# Patient Record
Sex: MALE | Race: BLACK OR AFRICAN AMERICAN | Employment: OTHER | URBAN - METROPOLITAN AREA
[De-identification: names, ages, dates, MRNs, and addresses within clinical notes are randomized per-mention and may not be internally consistent; named-entity substitution may affect disease eponyms.]

---

## 2019-05-15 ENCOUNTER — APPOINTMENT (OUTPATIENT)
Dept: GENERAL RADIOLOGY | Age: 69
End: 2019-05-15
Attending: EMERGENCY MEDICINE
Payer: MEDICARE

## 2019-05-15 ENCOUNTER — APPOINTMENT (OUTPATIENT)
Dept: ULTRASOUND IMAGING | Age: 69
End: 2019-05-15
Attending: EMERGENCY MEDICINE
Payer: MEDICARE

## 2019-05-15 ENCOUNTER — HOSPITAL ENCOUNTER (EMERGENCY)
Age: 69
Discharge: OTHER INSTITUTION | End: 2019-05-15
Attending: EMERGENCY MEDICINE | Admitting: EMERGENCY MEDICINE
Payer: MEDICARE

## 2019-05-15 VITALS
HEART RATE: 82 BPM | SYSTOLIC BLOOD PRESSURE: 128 MMHG | WEIGHT: 156 LBS | TEMPERATURE: 98.2 F | OXYGEN SATURATION: 100 % | DIASTOLIC BLOOD PRESSURE: 64 MMHG | RESPIRATION RATE: 17 BRPM

## 2019-05-15 DIAGNOSIS — E86.0 DEHYDRATION: ICD-10-CM

## 2019-05-15 DIAGNOSIS — N17.9 ACUTE RENAL FAILURE, UNSPECIFIED ACUTE RENAL FAILURE TYPE (HCC): Primary | ICD-10-CM

## 2019-05-15 LAB
ALBUMIN SERPL-MCNC: 2.6 G/DL (ref 3.4–5)
ALBUMIN/GLOB SERPL: 0.4 {RATIO} (ref 0.8–1.7)
ALP SERPL-CCNC: 141 U/L (ref 45–117)
ALT SERPL-CCNC: 103 U/L (ref 16–61)
ANION GAP SERPL CALC-SCNC: 18 MMOL/L (ref 3–18)
ANION GAP SERPL CALC-SCNC: 19 MMOL/L (ref 3–18)
APPEARANCE UR: ABNORMAL
AST SERPL-CCNC: 173 U/L (ref 15–37)
ATRIAL RATE: 75 BPM
BACTERIA URNS QL MICRO: ABNORMAL /HPF
BASOPHILS # BLD: 0 K/UL (ref 0–0.06)
BASOPHILS NFR BLD: 0 % (ref 0–3)
BILIRUB SERPL-MCNC: 0.7 MG/DL (ref 0.2–1)
BILIRUB UR QL: NEGATIVE
BNP SERPL-MCNC: ABNORMAL PG/ML (ref 0–900)
BUN SERPL-MCNC: 131 MG/DL (ref 7–18)
BUN SERPL-MCNC: 144 MG/DL (ref 7–18)
BUN/CREAT SERPL: 12 (ref 12–20)
BUN/CREAT SERPL: 13 (ref 12–20)
CALCIUM SERPL-MCNC: 7.2 MG/DL (ref 8.5–10.1)
CALCIUM SERPL-MCNC: 8.8 MG/DL (ref 8.5–10.1)
CALCULATED P AXIS, ECG09: 55 DEGREES
CALCULATED R AXIS, ECG10: 72 DEGREES
CALCULATED T AXIS, ECG11: 60 DEGREES
CHLORIDE SERPL-SCNC: 107 MMOL/L (ref 100–108)
CHLORIDE SERPL-SCNC: 96 MMOL/L (ref 100–108)
CK MB CFR SERPL CALC: 1.9 % (ref 0–4)
CK MB SERPL-MCNC: 4.6 NG/ML (ref 5–25)
CK SERPL-CCNC: 236 U/L (ref 39–308)
CO2 SERPL-SCNC: 12 MMOL/L (ref 21–32)
CO2 SERPL-SCNC: 14 MMOL/L (ref 21–32)
COLOR UR: YELLOW
CREAT SERPL-MCNC: 11.7 MG/DL (ref 0.6–1.3)
CREAT SERPL-MCNC: 9.86 MG/DL (ref 0.6–1.3)
DIAGNOSIS, 93000: NORMAL
DIFFERENTIAL METHOD BLD: ABNORMAL
EOSINOPHIL # BLD: 0.9 K/UL (ref 0–0.4)
EOSINOPHIL NFR BLD: 6 % (ref 0–5)
EPITH CASTS URNS QL MICRO: ABNORMAL /LPF (ref 0–5)
ERYTHROCYTE [DISTWIDTH] IN BLOOD BY AUTOMATED COUNT: 13.1 % (ref 11.6–14.5)
GLOBULIN SER CALC-MCNC: 6.7 G/DL (ref 2–4)
GLUCOSE BLD STRIP.AUTO-MCNC: 68 MG/DL (ref 70–110)
GLUCOSE SERPL-MCNC: 78 MG/DL (ref 74–99)
GLUCOSE SERPL-MCNC: 94 MG/DL (ref 74–99)
GLUCOSE UR STRIP.AUTO-MCNC: NEGATIVE MG/DL
HCT VFR BLD AUTO: 31.8 % (ref 36–48)
HGB BLD-MCNC: 11.4 G/DL (ref 13–16)
HGB UR QL STRIP: ABNORMAL
KETONES UR QL STRIP.AUTO: NEGATIVE MG/DL
LEUKOCYTE ESTERASE UR QL STRIP.AUTO: ABNORMAL
LYMPHOCYTES # BLD: 1.3 K/UL (ref 0.8–3.5)
LYMPHOCYTES NFR BLD: 9 % (ref 20–51)
MCH RBC QN AUTO: 30.8 PG (ref 24–34)
MCHC RBC AUTO-ENTMCNC: 35.8 G/DL (ref 31–37)
MCV RBC AUTO: 85.9 FL (ref 74–97)
MONOCYTES # BLD: 0.6 K/UL (ref 0–1)
MONOCYTES NFR BLD: 4 % (ref 2–9)
NEUTS BAND NFR BLD MANUAL: 5 % (ref 0–5)
NEUTS SEG # BLD: 11.9 K/UL (ref 1.8–8)
NEUTS SEG NFR BLD: 76 % (ref 42–75)
NITRITE UR QL STRIP.AUTO: NEGATIVE
P-R INTERVAL, ECG05: 154 MS
PH UR STRIP: 7 [PH] (ref 5–8)
PLATELET # BLD AUTO: 114 K/UL (ref 135–420)
PLATELET COMMENTS,PCOM: ABNORMAL
PMV BLD AUTO: 12.8 FL (ref 9.2–11.8)
POTASSIUM SERPL-SCNC: 4.4 MMOL/L (ref 3.5–5.5)
POTASSIUM SERPL-SCNC: 5 MMOL/L (ref 3.5–5.5)
PROT SERPL-MCNC: 9.3 G/DL (ref 6.4–8.2)
PROT UR STRIP-MCNC: 300 MG/DL
Q-T INTERVAL, ECG07: 362 MS
QRS DURATION, ECG06: 92 MS
QTC CALCULATION (BEZET), ECG08: 404 MS
RBC # BLD AUTO: 3.7 M/UL (ref 4.7–5.5)
RBC #/AREA URNS HPF: ABNORMAL /HPF (ref 0–5)
RBC MORPH BLD: ABNORMAL
SODIUM SERPL-SCNC: 129 MMOL/L (ref 136–145)
SODIUM SERPL-SCNC: 137 MMOL/L (ref 136–145)
SP GR UR REFRACTOMETRY: 1.01 (ref 1–1.03)
TROPONIN I SERPL-MCNC: 0.06 NG/ML (ref 0–0.04)
UROBILINOGEN UR QL STRIP.AUTO: 1 EU/DL (ref 0.2–1)
VENTRICULAR RATE, ECG03: 75 BPM
WBC # BLD AUTO: 14.7 K/UL (ref 4.6–13.2)
WBC MORPH BLD: ABNORMAL
WBC URNS QL MICRO: ABNORMAL /HPF (ref 0–4)

## 2019-05-15 PROCEDURE — 82962 GLUCOSE BLOOD TEST: CPT

## 2019-05-15 PROCEDURE — 87086 URINE CULTURE/COLONY COUNT: CPT

## 2019-05-15 PROCEDURE — 96360 HYDRATION IV INFUSION INIT: CPT

## 2019-05-15 PROCEDURE — 81001 URINALYSIS AUTO W/SCOPE: CPT

## 2019-05-15 PROCEDURE — 77030034850

## 2019-05-15 PROCEDURE — 76770 US EXAM ABDO BACK WALL COMP: CPT

## 2019-05-15 PROCEDURE — 74011250636 HC RX REV CODE- 250/636: Performed by: EMERGENCY MEDICINE

## 2019-05-15 PROCEDURE — 80053 COMPREHEN METABOLIC PANEL: CPT

## 2019-05-15 PROCEDURE — 82550 ASSAY OF CK (CPK): CPT

## 2019-05-15 PROCEDURE — 85025 COMPLETE CBC W/AUTO DIFF WBC: CPT

## 2019-05-15 PROCEDURE — 87186 SC STD MICRODIL/AGAR DIL: CPT

## 2019-05-15 PROCEDURE — 83880 ASSAY OF NATRIURETIC PEPTIDE: CPT

## 2019-05-15 PROCEDURE — 71046 X-RAY EXAM CHEST 2 VIEWS: CPT

## 2019-05-15 PROCEDURE — 87077 CULTURE AEROBIC IDENTIFY: CPT

## 2019-05-15 PROCEDURE — 96375 TX/PRO/DX INJ NEW DRUG ADDON: CPT

## 2019-05-15 PROCEDURE — 74011000250 HC RX REV CODE- 250: Performed by: EMERGENCY MEDICINE

## 2019-05-15 PROCEDURE — 96374 THER/PROPH/DIAG INJ IV PUSH: CPT

## 2019-05-15 PROCEDURE — 99285 EMERGENCY DEPT VISIT HI MDM: CPT

## 2019-05-15 PROCEDURE — 93005 ELECTROCARDIOGRAM TRACING: CPT

## 2019-05-15 PROCEDURE — 96361 HYDRATE IV INFUSION ADD-ON: CPT

## 2019-05-15 RX ORDER — METOPROLOL TARTRATE 50 MG/1
TABLET ORAL 2 TIMES DAILY
COMMUNITY

## 2019-05-15 RX ORDER — METOCLOPRAMIDE HYDROCHLORIDE 5 MG/ML
5 INJECTION INTRAMUSCULAR; INTRAVENOUS EVERY 6 HOURS
Status: DISCONTINUED | OUTPATIENT
Start: 2019-05-15 | End: 2019-05-15

## 2019-05-15 RX ORDER — SODIUM CHLORIDE 9 MG/ML
125 INJECTION, SOLUTION INTRAVENOUS CONTINUOUS
Status: DISCONTINUED | OUTPATIENT
Start: 2019-05-15 | End: 2019-05-16 | Stop reason: HOSPADM

## 2019-05-15 RX ORDER — HYDROCHLOROTHIAZIDE 25 MG/1
25 TABLET ORAL DAILY
COMMUNITY
End: 2019-12-06

## 2019-05-15 RX ORDER — CEFTRIAXONE 250 MG/8ML
1000 INJECTION, POWDER, FOR SOLUTION INTRAMUSCULAR; INTRAVENOUS ONCE
Status: DISCONTINUED | OUTPATIENT
Start: 2019-05-15 | End: 2019-05-15

## 2019-05-15 RX ORDER — METOCLOPRAMIDE HYDROCHLORIDE 5 MG/ML
5 INJECTION INTRAMUSCULAR; INTRAVENOUS
Status: COMPLETED | OUTPATIENT
Start: 2019-05-15 | End: 2019-05-15

## 2019-05-15 RX ORDER — LISINOPRIL 5 MG/1
TABLET ORAL DAILY
COMMUNITY

## 2019-05-15 RX ORDER — CLOBETASOL PROPIONATE 0.5 MG/ML
LOTION TOPICAL 2 TIMES DAILY
COMMUNITY
End: 2019-12-06

## 2019-05-15 RX ORDER — IBUPROFEN 200 MG
TABLET ORAL
COMMUNITY

## 2019-05-15 RX ADMIN — SODIUM CHLORIDE 500 ML: 900 INJECTION, SOLUTION INTRAVENOUS at 12:51

## 2019-05-15 RX ADMIN — SODIUM CHLORIDE 125 ML/HR: 900 INJECTION, SOLUTION INTRAVENOUS at 15:35

## 2019-05-15 RX ADMIN — METOCLOPRAMIDE 5 MG: 5 INJECTION, SOLUTION INTRAMUSCULAR; INTRAVENOUS at 16:23

## 2019-05-15 RX ADMIN — WATER 1 G: 1 INJECTION INTRAMUSCULAR; INTRAVENOUS; SUBCUTANEOUS at 15:51

## 2019-05-15 NOTE — ED NOTES
Reported from family pt had episode of vomiting clear emesis. Pt states still nauseated. Dr. Heber Seay notified. Verbal order obtained for 5 mg Reglan IV.

## 2019-05-15 NOTE — ED NOTES
TXFER STATUS UPDATE. . [facility] DON[facility] CURRENTLY HAS NO BEDS AVAILABLE. .. WILL CHK AGAIN LATER CHRISTOPHER. Floresita Ghosh ..

## 2019-05-15 NOTE — ED NOTES
PT IS RESTING QUIETLY WITH EYES CLOSED. .. RESP. EFFORT= EASY. .. SON AT BEDSIDE. ..  SON SAYS PT IS ASKING FOR \"SOMETHING TO PUT HIM OUT. .. AND FOR A MEAL. ...

## 2019-05-15 NOTE — ED NOTES
Bedside and Verbal shift change report given to ADALI Larios (oncoming nurse) by Randi Odonnell RN (offgoing nurse). Report included the following information SBAR, ED Summary, Procedure Summary, MAR, Recent Results and Cardiac Rhythm nsr.

## 2019-05-15 NOTE — ED PROVIDER NOTES
EMERGENCY DEPARTMENT HISTORY AND PHYSICAL EXAM 
 
11:40 AM 
 
 
Date: 5/15/2019 Patient Name: Iqra Jewell History of Presenting Illness Chief Complaint Patient presents with  Dizziness History Provided By: Patient and Patient's Sister Additional History (Context): Iqra Jewell is a 80 y.o. male with PMHx of HTN, heart failure and MI who presents to the ED with c/o worsening dizziness for the past week. Describes dizziness as \"room-spinning. \" Associated symptoms include weakness, congestion, decreased appetite and numbness in L foot. Also describes \"stumbling\" with ambulation, lightheaded with standing up. Denies nausea, fever, cough or difficulty urinating. No modifying or aggravating factors were reported. No other symptoms or concerns were expressed. PCP: UNKNOWN 
 
Chief Complaint: Dizziness Duration:  1 week Timing:  Worsening Location: Generalized Quality: Not described Severity: not reported Modifying Factors: No modifying or aggravating factors were reported. Associated Symptoms: weakness, congestion, decreased appetite and numbness in L foot Current Facility-Administered Medications Medication Dose Route Frequency Provider Last Rate Last Dose  sodium chloride 0.9 % bolus infusion 1,000 mL  1,000 mL IntraVENous ONCE Malu Pretty DO      
 0.9% sodium chloride infusion  125 mL/hr IntraVENous CONTINUOUS Malu Pretty DO      
 cefTRIAXone (ROCEPHIN) injection 1,000 mg  1,000 mg IntraVENous ONCE Malu Pretty, DO      
 
Current Outpatient Medications Medication Sig Dispense Refill  lisinopril (PRINIVIL, ZESTRIL) 5 mg tablet Take  by mouth daily.  metoprolol tartrate (LOPRESSOR) 50 mg tablet Take  by mouth two (2) times a day.  hydroCHLOROthiazide (HYDRODIURIL) 25 mg tablet Take 25 mg by mouth daily.  ibuprofen (MOTRIN IB) 200 mg tablet Take  by mouth.  clobetasol (CLOBEX) 0.05 % lotion Apply  to affected area two (2) times a day. Past History Past Medical History: 
Past Medical History:  
Diagnosis Date  Heart failure ([facility] [address]  Hypertension  MI (myocardial infarction) ([facility] [address]  Psoriasis Past Surgical History: 
Past Surgical History:  
Procedure Laterality Date  CARDIAC SURG PROCEDURE UNLIST Triple bypass 2010 Family History: 
History reviewed. No pertinent family history. Social History: 
Social History Tobacco Use  Smoking status: Not on file Substance Use Topics  Alcohol use: Not on file  Drug use: Not on file Allergies: 
No Known Allergies Review of Systems Review of Systems Constitutional: Positive for appetite change (decreased). Negative for activity change, fatigue and fever. HENT: Positive for congestion. Negative for rhinorrhea. Eyes: Negative for visual disturbance. Respiratory: Negative for shortness of breath. Cardiovascular: Negative for chest pain and palpitations. Gastrointestinal: Negative for abdominal pain, diarrhea, nausea and vomiting. Genitourinary: Negative for dysuria and hematuria. Musculoskeletal: Negative for back pain. Skin: Negative for rash. Neurological: Positive for dizziness, weakness, light-headedness and numbness (L foot). All other systems reviewed and are negative. Physical Exam  
 
Visit Vitals /78 Pulse 74 Temp 97.4 °F (36.3 °C) Resp 19 Wt 70.8 kg (156 lb) SpO2 100% Physical Exam  
Constitutional: He is oriented to person, place, and time. He appears well-developed. No distress. HENT:  
Head: Normocephalic and atraumatic. Right Ear: External ear normal.  
Left Ear: External ear normal.  
Nose: Nose normal.  
Dry MM Eyes: Pupils are equal, round, and reactive to light. Conjunctivae and EOM are normal.  
Neck: Normal range of motion. Neck supple. No tracheal deviation present. Cardiovascular: Normal rate, regular rhythm and intact distal pulses. Pulmonary/Chest: Effort normal and breath sounds normal. He exhibits no tenderness. Abdominal: Soft. Bowel sounds are normal. He exhibits no distension. There is no tenderness. There is no rebound and no guarding. Musculoskeletal: Normal range of motion. He exhibits no edema or tenderness. Neurological: He is alert and oriented to person, place, and time. No cranial nerve deficit. Coordination normal.  
Skin: Skin is warm and dry. Psychiatric: He has a normal mood and affect. His behavior is normal. Thought content normal.  
Nursing note and vitals reviewed. Diagnostic Study Results Labs - Recent Results (from the past 12 hour(s)) EKG, 12 LEAD, INITIAL Collection Time: 05/15/19 11:21 AM  
Result Value Ref Range Ventricular Rate 75 BPM  
 Atrial Rate 75 BPM  
 P-R Interval 154 ms QRS Duration 92 ms Q-T Interval 362 ms QTC Calculation (Bezet) 404 ms Calculated P Axis 55 degrees Calculated R Axis 72 degrees Calculated T Axis 60 degrees Diagnosis Normal sinus rhythm Possible Left atrial enlargement Left ventricular hypertrophy Nonspecific ST and T wave abnormality Abnormal ECG No previous ECGs available Confirmed by Rosa Maria Gutierrez (Jerry) on 5/15/2019 1:37:03 PM 
  
CBC WITH AUTOMATED DIFF Collection Time: 05/15/19 11:35 AM  
Result Value Ref Range WBC 14.7 (H) 4.6 - 13.2 K/uL  
 RBC 3.70 (L) 4.70 - 5.50 M/uL  
 HGB 11.4 (L) 13.0 - 16.0 g/dL HCT 31.8 (L) 36.0 - 48.0 % MCV 85.9 74.0 - 97.0 FL  
 MCH 30.8 24.0 - 34.0 PG  
 MCHC 35.8 31.0 - 37.0 g/dL  
 RDW 13.1 11.6 - 14.5 % PLATELET 656 (L) 784 - 420 K/uL MPV 12.8 (H) 9.2 - 11.8 FL  
 NEUTROPHILS 76 (H) 42 - 75 % BAND NEUTROPHILS 5 0 - 5 % LYMPHOCYTES 9 (L) 20 - 51 % MONOCYTES 4 2 - 9 % EOSINOPHILS 6 (H) 0 - 5 % BASOPHILS 0 0 - 3 %  
 ABS. NEUTROPHILS 11.9 (H) 1.8 - 8.0 K/UL ABS. LYMPHOCYTES 1.3 0.8 - 3.5 K/UL  
 ABS. MONOCYTES 0.6 0 - 1.0 K/UL  
 ABS. EOSINOPHILS 0.9 (H) 0.0 - 0.4 K/UL  
 ABS. BASOPHILS 0.0 0.0 - 0.06 K/UL  
 DF MANUAL PLATELET COMMENTS DECREASED PLATELETS    
 RBC COMMENTS NORMOCYTIC, NORMOCHROMIC    
 WBC COMMENTS TOXIC GRANULATION    
METABOLIC PANEL, COMPREHENSIVE Collection Time: 05/15/19 11:35 AM  
Result Value Ref Range Sodium 129 (L) 136 - 145 mmol/L Potassium 5.0 3.5 - 5.5 mmol/L Chloride 96 (L) 100 - 108 mmol/L  
 CO2 14 (L) 21 - 32 mmol/L Anion gap 19 (H) 3.0 - 18 mmol/L Glucose 94 74 - 99 mg/dL  (H) 7.0 - 18 MG/DL Creatinine 11.70 (H) 0.6 - 1.3 MG/DL  
 BUN/Creatinine ratio 12 12 - 20 GFR est AA 5 (L) >60 ml/min/1.73m2 GFR est non-AA 4 (L) >60 ml/min/1.73m2 Calcium 8.8 8.5 - 10.1 MG/DL Bilirubin, total 0.7 0.2 - 1.0 MG/DL  
 ALT (SGPT) 103 (H) 16 - 61 U/L  
 AST (SGOT) 173 (H) 15 - 37 U/L Alk. phosphatase 141 (H) 45 - 117 U/L Protein, total 9.3 (H) 6.4 - 8.2 g/dL Albumin 2.6 (L) 3.4 - 5.0 g/dL Globulin 6.7 (H) 2.0 - 4.0 g/dL A-G Ratio 0.4 (L) 0.8 - 1.7 CARDIAC PANEL,(CK, CKMB & TROPONIN) Collection Time: 05/15/19 11:35 AM  
Result Value Ref Range  39 - 308 U/L  
 CK - MB 4.6 (H) <3.6 ng/ml CK-MB Index 1.9 0.0 - 4.0 % Troponin-I, QT 0.06 (H) 0.0 - 0.045 NG/ML  
NT-PRO BNP Collection Time: 05/15/19 11:35 AM  
Result Value Ref Range NT pro-BNP 15,626 (H) 0 - 900 PG/ML  
GLUCOSE, POC Collection Time: 05/15/19 11:40 AM  
Result Value Ref Range Glucose (POC) 68 (L) 70 - 110 mg/dL URINALYSIS W/ RFLX MICROSCOPIC Collection Time: 05/15/19 12:49 PM  
Result Value Ref Range Color YELLOW Appearance TURBID Specific gravity 1.012 1.005 - 1.030    
 pH (UA) 7.0 5.0 - 8.0 Protein 300 (A) NEG mg/dL Glucose NEGATIVE  NEG mg/dL Ketone NEGATIVE  NEG mg/dL Bilirubin NEGATIVE  NEG Blood MODERATE (A) NEG Urobilinogen 1.0 0.2 - 1.0 EU/dL Nitrites NEGATIVE  NEG Leukocyte Esterase LARGE (A) NEG URINE MICROSCOPIC ONLY Collection Time: 05/15/19 12:49 PM  
Result Value Ref Range WBC TOO NUMEROUS TO COUNT 0 - 4 /hpf  
 RBC 4 to 10 0 - 5 /hpf Epithelial cells FEW 0 - 5 /lpf Bacteria 4+ (A) NEG /hpf METABOLIC PANEL, BASIC Collection Time: 05/15/19  2:42 PM  
Result Value Ref Range Sodium 137 136 - 145 mmol/L Potassium 4.4 3.5 - 5.5 mmol/L Chloride 107 100 - 108 mmol/L  
 CO2 12 (L) 21 - 32 mmol/L Anion gap 18 3.0 - 18 mmol/L Glucose 78 74 - 99 mg/dL  (H) 7.0 - 18 MG/DL Creatinine 9.86 (H) 0.6 - 1.3 MG/DL  
 BUN/Creatinine ratio 13 12 - 20 GFR est AA 6 (L) >60 ml/min/1.73m2 GFR est non-AA 5 (L) >60 ml/min/1.73m2 Calcium 7.2 (L) 8.5 - 10.1 MG/DL Radiologic Studies -  
US RETROPERITONEUM COMP Final Result IMPRESSION:  
  
Sonographic appearance of the kidneys are within normal limits. No hydronephrosis. XR CHEST PA LAT Final Result IMPRESSION:  
  
1. No radiographic evidence for an acute cardiopulmonary abnormality. Medical Decision Making It should be noted that IElvia DO will be the provider of record for this patient. I reviewed the vital signs, available nursing notes, past medical history, past surgical history, family history and social history. Vital Signs-Reviewed the patient's vital signs. Pulse Oximetry Analysis -  100% on room air, stable. Cardiac Monitor: 
Rate: 74 Rhythm:  Normal Sinus Rhythm EKG: Interpreted by the EP. Time Interpreted: 11:21 AM 
 Rate: 75 Rhythm: Normal Sinus Rhythm Interpretation: No acute changes. Records Reviewed: Nursing Notes and Old Medical Records (Time of Review: 11:40 AM) ED Course: Progress Notes, Reevaluation, and Consults: 
12:30 PM: Reviewed and discussed labs with patient.  Patient offered to be transferred to [place], requests to be transferred to [facility] where he has had heart surgery before. Will contact transfer center. Consult:  Discussed care with transfer center. Standard discussion; including history of patients chief complaint, available diagnostic results, and treatment course. Will call back. 12:35 PM, 5/15/2019 Consult:  Discussed care with Dr. Zee De La Fuente, ICU. Standard discussion; including history of patients chief complaint, available diagnostic results, and treatment course. Accepts admission. Recommends repeating fluids. 1:03 PM, 5/15/2019  
 
3:31 PM 
Repeat BMP with improving sodium, potassium, and creatinine. Urine concerning for UTI. Rocephin initiated. Urine culture sent. Due to the patient's psoriasis, it is difficult to get blood work on him. Unable to obtain blood cultures. 4:58 PM 
Resting comfortably. Medically stable for transfer 8:30 PM 
Resting comfortably. Medically stable for transfer Provider Notes (Medical Decision Making):  
Patient is a 81-year-old male who comes in complaining of dizziness. Denies pain anywhere. States that he has not felt well over the last week and has not been eating or drinking over the last 5 days. Patient with creatinine of 11 today. Last creatinine from April was 2.2. Patient has no history of renal failure that he is aware of. Patient is also hyponatremic with a potassium of 5. Catheter has been placed and patient is making urine. IV fluid hydration has been started. Patient was offered to be [facility] but did not want to go there. Prefers to go to [facility], where he states he has been before for his heart. Of note, all the patient's records do appear to be in the [facility] system. Discussed with hospitalist who will accept the patient for transfer. Recommends renal ultrasound and repeat BMP at 1430.   Discussed with patient and family who are agreeable to transfer. Patient is stable at the time of transfer with no complaints. For Hospitalized Patients: 
 
1. Hospitalization Decision Time: The decision to hospitalize the patient was made by Dr. Sina Clancy at 1:09 PM on 5/15/2019 2. Aspirin: Aspirin was not given because the patient did not present with a stroke at the time of their Emergency Department evaluation Diagnosis Clinical Impression: 1. Acute renal failure, unspecified acute renal failure type ([address] 2. Dehydration Disposition: Transfer to [facility] for admission. Follow-up Information None Patient's Medications Start Taking No medications on file Continue Taking CLOBETASOL (CLOBEX) 0.05 % LOTION    Apply  to affected area two (2) times a day. HYDROCHLOROTHIAZIDE (HYDRODIURIL) 25 MG TABLET    Take 25 mg by mouth daily. IBUPROFEN (MOTRIN IB) 200 MG TABLET    Take  by mouth. LISINOPRIL (PRINIVIL, ZESTRIL) 5 MG TABLET    Take  by mouth daily. METOPROLOL TARTRATE (LOPRESSOR) 50 MG TABLET    Take  by mouth two (2) times a day. These Medications have changed No medications on file Stop Taking No medications on file  
 
_______________________________ Scribe Attestation Priscila Middleton acting as a scribe for and in the presence of Rufino Stone DO May 15, 2019 at 11:40 AM 
    
Provider Attestation:     
I personally performed the services described in the documentation, reviewed the documentation, as recorded by the scribe in my presence, and it accurately and completely records my words and actions. May 15, 2019 at 11:40 AM - Rufino SOUZA DO   
 
 
_______________________________

## 2019-05-16 NOTE — ED NOTES
LDA removed in [facility] for documentation purposes only. Patient admitted to hospital with; site 1- Peripheral IV, which at time of admission is Clean, Dry, and intact, no signs or symptoms of phlebitis. No signs or symptoms of infiltration. LDA removed in [facility] for documentation purposes only. Patient admitted to hospital with; site 1- Urinary Catheter, which at time of admission is Clean, Dry, and intact, no signs or symptoms of phlebitis. No signs or symptoms of infiltration, patent,draining.

## 2019-05-16 NOTE — ED NOTES
PT'S SON, RENY CARR -[phone], WAS CALLED TO INFORM HIM OF HIS TXFER AT THIS TIME TO [facility] ROOM # [id number]. Starr Castellano HE UNDERSTOOD. Starr Castellano

## 2019-05-16 NOTE — ED NOTES
REC'D A CALL FROM FRANCA EUCEDA RN AT [facility] STEP DOWN UNIT FOR A PT \"GENERAL OVERVIEW\" AND [facility] CHIEF COMPLAINT/HX/DX/CURRENT CONDITION INFO. GIVEN TO HER. . AS WELL AS FAMILY'S REQUEST TO TXFER THERE BECAUSE PT'S CARDIOLOGIST IS THERE. .. STILL NO PT ROOMS AVAILABLE. ..

## 2019-05-16 NOTE — ROUTINE PROCESS
TRANSFER - OUT REPORT: 
 
Verbal report given to Laura Rodri on [place]  being transferred to [place] [id number] # [id number] for routine progression of care Report consisted of patients Situation, Background, Assessment and  
Recommendations(SBAR). Information from the following report(s) SBAR, ED Summary, Intake/Output, MAR, Recent Results and Med Rec Status was reviewed with the receiving nurse. Lines:  
Peripheral IV 05/15/19 Left Antecubital (Active) Site Assessment Clean, dry, & intact 5/15/2019  3:38 PM  
Phlebitis Assessment 0 5/15/2019  3:38 PM  
Infiltration Assessment 0 5/15/2019  3:38 PM  
Dressing Status Clean, dry, & intact 5/15/2019  3:38 PM  
  
 
Opportunity for questions and clarification was provided. Patient transported with: 
 Monitor

## 2019-05-16 NOTE — ED NOTES
PT REMAINS RESTING WITH EYES CLOSED. .. ROUSES EASILY TO VERBAL. Gisselle Hoskins DENIES PAIN. Gisselle Gato HE DOES NOT WANT TO BE MOVED TO A HOSPITAL BED SAYING HE'S COMFORTABLE WHERE HE'S AT. .. HE EXPECTS TO STAY HERE FOR THE NIGHT. .. WILL NOTIFY HIS FAMILY OF TXFER TIME AND ROOM #. ...

## 2019-05-17 LAB
BACTERIA SPEC CULT: ABNORMAL
SERVICE CMNT-IMP: ABNORMAL

## 2020-07-12 ENCOUNTER — ANESTHESIA EVENT (OUTPATIENT)
Dept: ENDOSCOPY | Age: 70
End: 2020-07-12
Payer: MEDICARE

## 2020-07-13 ENCOUNTER — HOSPITAL ENCOUNTER (OUTPATIENT)
Age: 70
Setting detail: OUTPATIENT SURGERY
Discharge: HOME | End: 2020-07-13
Attending: INTERNAL MEDICINE | Admitting: INTERNAL MEDICINE
Payer: MEDICARE

## 2020-07-13 ENCOUNTER — ANESTHESIA (OUTPATIENT)
Dept: ENDOSCOPY | Age: 70
End: 2020-07-13
Payer: MEDICARE

## 2020-07-13 VITALS
SYSTOLIC BLOOD PRESSURE: 140 MMHG | WEIGHT: 152 LBS | DIASTOLIC BLOOD PRESSURE: 72 MMHG | OXYGEN SATURATION: 100 % | RESPIRATION RATE: 18 BRPM | HEIGHT: 70 IN | BODY MASS INDEX: 21.76 KG/M2 | HEART RATE: 88 BPM | TEMPERATURE: 98 F

## 2020-07-13 PROCEDURE — 77030008565 HC TBNG SUC IRR ERBE -B: Performed by: INTERNAL MEDICINE

## 2020-07-13 PROCEDURE — 74011250636 HC RX REV CODE- 250/636: Performed by: NURSE ANESTHETIST, CERTIFIED REGISTERED

## 2020-07-13 PROCEDURE — 74011000250 HC RX REV CODE- 250: Performed by: NURSE ANESTHETIST, CERTIFIED REGISTERED

## 2020-07-13 PROCEDURE — 77030019988 HC FCPS ENDOSC DISP BSC -B: Performed by: INTERNAL MEDICINE

## 2020-07-13 PROCEDURE — 77030013992 HC SNR POLYP ENDOSC BSC -B: Performed by: INTERNAL MEDICINE

## 2020-07-13 PROCEDURE — 76060000031 HC ANESTHESIA FIRST 0.5 HR: Performed by: INTERNAL MEDICINE

## 2020-07-13 PROCEDURE — 77030021593 HC FCPS BIOP ENDOSC BSC -A: Performed by: INTERNAL MEDICINE

## 2020-07-13 PROCEDURE — 88305 TISSUE EXAM BY PATHOLOGIST: CPT

## 2020-07-13 PROCEDURE — 76040000019: Performed by: INTERNAL MEDICINE

## 2020-07-13 PROCEDURE — 77030018846 HC SOL IRR STRL H20 ICUM -A: Performed by: INTERNAL MEDICINE

## 2020-07-13 PROCEDURE — 77030029384 HC SNR POLYP CAPTVR II BSC -B: Performed by: INTERNAL MEDICINE

## 2020-07-13 RX ORDER — LIDOCAINE HYDROCHLORIDE 20 MG/ML
INJECTION, SOLUTION EPIDURAL; INFILTRATION; INTRACAUDAL; PERINEURAL AS NEEDED
Status: DISCONTINUED | OUTPATIENT
Start: 2020-07-13 | End: 2020-07-13 | Stop reason: HOSPADM

## 2020-07-13 RX ORDER — SODIUM CHLORIDE 0.9 % (FLUSH) 0.9 %
5-40 SYRINGE (ML) INJECTION EVERY 8 HOURS
Status: DISCONTINUED | OUTPATIENT
Start: 2020-07-13 | End: 2020-07-13 | Stop reason: HOSPADM

## 2020-07-13 RX ORDER — DEXTROSE MONOHYDRATE AND SODIUM CHLORIDE 5; .225 G/100ML; G/100ML
25 INJECTION, SOLUTION INTRAVENOUS CONTINUOUS
Status: DISCONTINUED | OUTPATIENT
Start: 2020-07-13 | End: 2020-07-13 | Stop reason: HOSPADM

## 2020-07-13 RX ORDER — SODIUM CHLORIDE 0.9 % (FLUSH) 0.9 %
5-40 SYRINGE (ML) INJECTION AS NEEDED
Status: DISCONTINUED | OUTPATIENT
Start: 2020-07-13 | End: 2020-07-13 | Stop reason: HOSPADM

## 2020-07-13 RX ORDER — SODIUM CHLORIDE, SODIUM LACTATE, POTASSIUM CHLORIDE, CALCIUM CHLORIDE 600; 310; 30; 20 MG/100ML; MG/100ML; MG/100ML; MG/100ML
INJECTION, SOLUTION INTRAVENOUS
Status: DISCONTINUED | OUTPATIENT
Start: 2020-07-13 | End: 2020-07-13 | Stop reason: HOSPADM

## 2020-07-13 RX ORDER — PROPOFOL 10 MG/ML
INJECTION, EMULSION INTRAVENOUS AS NEEDED
Status: DISCONTINUED | OUTPATIENT
Start: 2020-07-13 | End: 2020-07-13 | Stop reason: HOSPADM

## 2020-07-13 RX ORDER — LIDOCAINE HYDROCHLORIDE 10 MG/ML
0.1 INJECTION, SOLUTION EPIDURAL; INFILTRATION; INTRACAUDAL; PERINEURAL AS NEEDED
Status: DISCONTINUED | OUTPATIENT
Start: 2020-07-13 | End: 2020-07-13 | Stop reason: HOSPADM

## 2020-07-13 RX ADMIN — PROPOFOL 80 MG: 10 INJECTION, EMULSION INTRAVENOUS at 07:59

## 2020-07-13 RX ADMIN — PROPOFOL 20 MG: 10 INJECTION, EMULSION INTRAVENOUS at 08:06

## 2020-07-13 RX ADMIN — LIDOCAINE HYDROCHLORIDE 100 MG: 20 INJECTION, SOLUTION EPIDURAL; INFILTRATION; INTRACAUDAL; PERINEURAL at 08:03

## 2020-07-13 RX ADMIN — PROPOFOL 30 MG: 10 INJECTION, EMULSION INTRAVENOUS at 08:12

## 2020-07-13 RX ADMIN — SODIUM CHLORIDE, SODIUM LACTATE, POTASSIUM CHLORIDE, AND CALCIUM CHLORIDE: 600; 310; 30; 20 INJECTION, SOLUTION INTRAVENOUS at 07:46

## 2020-07-13 RX ADMIN — PROPOFOL 20 MG: 10 INJECTION, EMULSION INTRAVENOUS at 08:09

## 2020-07-13 RX ADMIN — PROPOFOL 30 MG: 10 INJECTION, EMULSION INTRAVENOUS at 08:01

## 2020-07-13 RX ADMIN — PROPOFOL 20 MG: 10 INJECTION, EMULSION INTRAVENOUS at 08:03

## 2020-07-13 RX ADMIN — FAMOTIDINE 20 MG: 10 INJECTION, SOLUTION INTRAVENOUS at 07:48

## 2020-07-13 RX ADMIN — PROPOFOL 20 MG: 10 INJECTION, EMULSION INTRAVENOUS at 08:18

## 2020-07-13 RX ADMIN — PROPOFOL 30 MG: 10 INJECTION, EMULSION INTRAVENOUS at 08:15

## 2020-07-13 NOTE — DISCHARGE INSTRUCTIONS
Patient Education        Upper GI Endoscopy: What to Expect at [address] had an upper GI endoscopy. Your doctor used a thin, lighted tube that bends to look at the inside of your esophagus, your stomach, and the first part of the small intestine, called the duodenum. After you have an endoscopy, you will stay at the hospital or clinic for 1 to 2 hours. This will allow the medicine to wear off. You will be able to go home after your doctor or nurse checks to make sure that you're not having any problems. You may have to stay overnight if you had treatment during the test. You may have a sore throat for a day or two after the test.  This care sheet gives you a general idea about what to expect after the test.  How can you care for yourself at home? Activity   · Rest as much as you need to after you go home. · You should be able to go back to your usual activities the day after the test.  Diet   · Follow your doctor's directions for eating after the test.  · Drink plenty of fluids (unless your doctor has told you not to). Medications   · If you have a sore throat the day after the test, use an over-the-counter spray to numb your throat. Follow-up care is a key part of your treatment and safety. Be sure to make and go to all appointments, and call your doctor if you are having problems. It's also a good idea to know your test results and keep a list of the medicines you take. When should you call for help? [id number] anytime you think you may need emergency care. For example, call if:  · You passed out (loses consciousness). · You have trouble breathing. · You pass maroon or bloody stools. Call your doctor now or seek immediate medical care if:  · You have pain that does not get better after your take pain medicine. · You have new or worse belly pain. · You have blood in your stools. · You are sick to your stomach and cannot keep fluids down. · You have a fever.   · You cannot pass stools or gas.  Watch closely for changes in your health, and be sure to contact your doctor if:  · Your throat still hurts after a day or two. · You do not get better as expected. Where can you learn more? Go to http://www.zhang.com/  Enter J454 in the search box to learn more about \"Upper GI Endoscopy: What to Expect at Home. \"  Current as of: August 12, 2019               Content Version: 12.5  © 6556-5659 Healthwise, [employer]. Care instructions adapted under license by [employer] (which disclaims liability or warranty for this information). If you have questions about a medical condition or this instruction, always ask your healthcare professional. [address] any warranty or liability for your use of this information.

## 2020-07-13 NOTE — PROCEDURES
[place]  [facility] [place], [address]      Brief Procedure Note    Kody Born  1950  [id number]    Date of Procedure: 7/13/2020    Preoperative diagnosis: Occult blood in stools:   792.1 - R19.5  Hypertension  Abdominal pain, epigastric:   789.06 - R10.13  Renal failure syndrome:   586 - N19  Old myocardial infarction:   12 - I25.2  Iron deficiency anemia:   280.9 - D50.9    Postoperative diagnosis:  EGD: Hiatal Hernia, body and antrum bx's, gastritis, duodenal polyp  [place]: diverticulosis, hemorrhoids    Type of Anesthesia: MAC (monitered anesthesia care)    Description of Findings: same as post op dx    Procedure: Procedure(s):  UPPER ENDOSCOPY with bx's with polypectomy  COLONOSCOPY    :  Dr. Nirmal Fletcher MD    Assistant(s): [unfilled]    Type of Anesthesia:MAC     EBL:None, no implants.     Specimens:   ID Type Source Tests Collected by Time Destination   1 : Antrum & body Bx's Preservative Gastric  Jerry Fontaine MD 7/13/2020 0802 Pathology   2 : duodenal polyp x2 Preservative Duodenum  Jerry Fontaine MD 7/13/2020 9321 Pathology       Findings: See printed and scanned procedure note    Complications: None    Dr. Nirmal Fletcher MD  7/13/2020  8:30 AM

## 2020-07-13 NOTE — ANESTHESIA PREPROCEDURE EVALUATION
Relevant Problems   No relevant active problems       Anesthetic History   No history of anesthetic complications            Review of Systems / Medical History  Patient summary reviewed and pertinent labs reviewed    Pulmonary          Smoker         Neuro/Psych   Within defined limits           Cardiovascular    Hypertension: well controlled        Dysrhythmias : atrial flutter  Past MI, CAD, CABG and hyperlipidemia    Exercise tolerance: <4 METS  Comments: CABG 2010   GI/Hepatic/Renal     GERD: well controlled    Renal disease: CRI      Comments: Remote h/o dialysis secondary UTI Endo/Other        Anemia     Other Findings              Physical Exam    Airway  Mallampati: III  TM Distance: > 6 cm  Neck ROM: normal range of motion   Mouth opening: Normal     Cardiovascular  Regular rate and rhythm,  S1 and S2 normal,  no murmur, click, rub, or gallop             Dental    Dentition: Poor dentition     Pulmonary      Decreased breath sounds: bilateral           Abdominal  GI exam deferred       Other Findings            Anesthetic Plan    ASA: 4  Anesthesia type: MAC and general - backup          Induction: Intravenous  Anesthetic plan and risks discussed with: Patient

## 2020-07-13 NOTE — H&P
Chief Complaint: Anemia with positive cologuard. History of present illness: Never had colonoscopy. PMH:   Past Medical History:   Diagnosis Date    Arrhythmia 07/2019    PAF/Flutter    Dialysis AV fistula infection (HCC)     left arm    ESRD on hemodialysis (HCC)     GERD (gastroesophageal reflux disease)     Heart failure (HCC)     Hypertension     Iron deficiency anemia     [facility] [address]     Prostate disorder     Psoriasis     Urinary retention      Allergies: No Known Allergies  Medications: No current facility-administered medications for this encounter. FH:   Family History   Problem Relation Age of Onset    Hypertension Father      Social:   Social History     Socioeconomic History    Marital status:      Spouse name: Not on file    Number of children: Not on file    Years of education: Not on file    Highest education level: Not on file   Tobacco Use    Smoking status: Current Some Day Smoker     Years: 15.00    Smokeless tobacco: Never Used    Tobacco comment: 3-4 CIGS/DAY   Substance and Sexual Activity    Alcohol use: Never     Frequency: Never    Drug use: Never     Surgical H:   Past Surgical History:   Procedure Laterality Date    CARDIAC SURG PROCEDURE UNLIST      Triple bypass 2010    HX ARTERIOVENOUS FISTULA Left        ROS: positive for anemia weakness. Physical Exam: There were no vitals taken for this visit. General appearance: alert, no distress  Eyes: pupils equal and reactive, extraocular eye movements intact  Nodes: No gross adenopathy in neck.   Skin: no spider angiomata, jaundice, palmar erythema   Respiratory: clear to auscultation bilaterally  Cardiovascular: regular heart rate, no murmurs, no JVD, normal rate and regular rhythm  Abdomen: soft, non-tender, liver not enlarged, spleen not palpable, no obvious ascites  Extremities: no muscle wasting, no gross arthritic changes  Neurologic: alert and oriented, cranial nerves grossly intact, no asterixis    Labs: No results found for this or any previous visit (from the past 24 hour(s)). Imp/ Plan: Will proceed with colonoscopy as planned. Risk benefits alternative including but not limited to infection, bleeding, perforation of viscous, allergic reaction and resultant morbidity and mortality was discussed. Chance of missing a significant lesion due to various reasons were discussed.       Laurent Ruvalcaba MD  [facility], [place]

## 2020-07-13 NOTE — ANESTHESIA POSTPROCEDURE EVALUATION
Procedure(s):  UPPER ENDOSCOPY with bx's with polypectomy  COLONOSCOPY.    MAC, general - backup    Anesthesia Post Evaluation      Multimodal analgesia: multimodal analgesia used between 6 hours prior to anesthesia start to PACU discharge  Patient location during evaluation: PACU  Patient participation: complete - patient participated  Level of consciousness: awake  Pain score: 0  Pain management: adequate  Airway patency: patent  Anesthetic complications: no  Cardiovascular status: acceptable  Respiratory status: acceptable  Hydration status: acceptable  Post anesthesia nausea and vomiting:  none  Final Post Anesthesia Temperature Assessment:  Normothermia (36.0-37.5 degrees C)      INITIAL Post-op Vital signs:   Vitals Value Taken Time   /68 7/13/2020  8:49 AM   Temp 36.8 °C (98.2 °F) 7/13/2020  8:29 AM   Pulse 59 7/13/2020  8:50 AM   Resp 13 7/13/2020  8:50 AM   SpO2 100 % 7/13/2020  8:50 AM   Vitals shown include unvalidated device data.

## (undated) DEVICE — BASIN EMESIS 500CC ROSE 250/CS 60/PLT: Brand: MEDEGEN MEDICAL PRODUCTS, LLC

## (undated) DEVICE — CANNULA ORIG TL CLR W FOAM CUSHIONS AND 14FT SUPL TB 3 CHN

## (undated) DEVICE — STERILE POLYISOPRENE POWDER-FREE SURGICAL GLOVES: Brand: PROTEXIS

## (undated) DEVICE — SYRINGE MED 25GA 3ML L5/8IN SUBQ PLAS W/ DETACH NDL SFTY

## (undated) DEVICE — FLUFF AND POLYMER UNDERPAD,EXTRA HEAVY: Brand: WINGS

## (undated) DEVICE — SNARE POLYP M W27MMXL240CM OVL STIFF DISP CAPTIVATOR

## (undated) DEVICE — AIRLIFE™ NASAL OXYGEN CANNULA CURVED, FLARED TIP WITH 14 FOOT (4.3 M) CRUSH-RESISTANT TUBING, OVER-THE-EAR STYLE: Brand: AIRLIFE™

## (undated) DEVICE — SOLUTION IRRIG 1000ML H2O STRL BLT

## (undated) DEVICE — MEDI-VAC NON-CONDUCTIVE SUCTION TUBING: Brand: CARDINAL HEALTH

## (undated) DEVICE — CATHETER SUCT TR FL TIP 14FR W/ O CTRL

## (undated) DEVICE — SNARE VASC L240CM LOOP W10MM SHTH DIA2.4MM RND STIFF CLD

## (undated) DEVICE — FORCEPS BX L240CM JAW DIA2.8MM L CAP W/ NDL MIC MESH TOOTH

## (undated) DEVICE — GOWN ISOL IMPERV UNIV, DISP, OPEN BACK, BLUE --

## (undated) DEVICE — ENDOSCOPY PUMP TUBING/ CAP SET: Brand: ERBE

## (undated) DEVICE — MEDI-VAC SUCTION HIGH CAPACITY: Brand: CARDINAL HEALTH

## (undated) DEVICE — BITE BLOCK ENDOSCP UNIV AD 6 TO 9.4 MM

## (undated) DEVICE — TRAP SPEC COLL POLYP POLYSTYR --

## (undated) DEVICE — GAUZE,SPONGE,4"X4",16PLY,STRL,LF,10/TRAY: Brand: MEDLINE

## (undated) DEVICE — FLEX ADVANTAGE 3000CC: Brand: FLEX ADVANTAGE

## (undated) DEVICE — SYR 10ML LUER LOK 1/5ML GRAD --

## (undated) DEVICE — SYR 50ML SLIP TIP NSAF LF STRL --

## (undated) DEVICE — AIRLIFE™ NASAL OXYGEN CANNULA CURVED, NONFLARED TIP WITH 14 FOOT (4.3 M) CRUSH-RESISTANT TUBING, OVER-THE-EAR STYLE: Brand: AIRLIFE™

## (undated) DEVICE — FCPS RAD JAW 4LC 240CM W/NDL -- BX/20 RADIAL JAW 4

## (undated) DEVICE — SYR 20ML LL STRL LF --